# Patient Record
Sex: FEMALE | Race: WHITE | NOT HISPANIC OR LATINO
[De-identification: names, ages, dates, MRNs, and addresses within clinical notes are randomized per-mention and may not be internally consistent; named-entity substitution may affect disease eponyms.]

---

## 2021-01-03 ENCOUNTER — TRANSCRIPTION ENCOUNTER (OUTPATIENT)
Age: 76
End: 2021-01-03

## 2021-01-03 ENCOUNTER — FORM ENCOUNTER (OUTPATIENT)
Age: 76
End: 2021-01-03

## 2021-01-06 ENCOUNTER — APPOINTMENT (OUTPATIENT)
Dept: DISASTER EMERGENCY | Facility: HOSPITAL | Age: 76
End: 2021-01-06

## 2021-01-06 ENCOUNTER — OUTPATIENT (OUTPATIENT)
Dept: OUTPATIENT SERVICES | Facility: HOSPITAL | Age: 76
LOS: 1 days | End: 2021-01-06
Payer: MEDICARE

## 2021-01-06 VITALS
TEMPERATURE: 97 F | RESPIRATION RATE: 15 BRPM | SYSTOLIC BLOOD PRESSURE: 121 MMHG | OXYGEN SATURATION: 98 % | DIASTOLIC BLOOD PRESSURE: 58 MMHG | HEART RATE: 80 BPM

## 2021-01-06 VITALS
WEIGHT: 149.91 LBS | TEMPERATURE: 98 F | HEART RATE: 74 BPM | DIASTOLIC BLOOD PRESSURE: 65 MMHG | SYSTOLIC BLOOD PRESSURE: 129 MMHG | RESPIRATION RATE: 16 BRPM | HEIGHT: 63 IN | OXYGEN SATURATION: 99 %

## 2021-01-06 DIAGNOSIS — U07.1 COVID-19: ICD-10-CM

## 2021-01-06 PROBLEM — Z00.00 ENCOUNTER FOR PREVENTIVE HEALTH EXAMINATION: Status: ACTIVE | Noted: 2021-01-06

## 2021-01-06 PROCEDURE — M0239: CPT

## 2021-01-06 RX ORDER — ACETAMINOPHEN 500 MG
650 TABLET ORAL ONCE
Refills: 0 | Status: DISCONTINUED | OUTPATIENT
Start: 2021-01-06 | End: 2021-01-21

## 2021-01-06 RX ORDER — BAMLANIVIMAB 35 MG/ML
700 INJECTION, SOLUTION INTRAVENOUS ONCE
Refills: 0 | Status: COMPLETED | OUTPATIENT
Start: 2021-01-06 | End: 2021-01-06

## 2021-01-06 RX ADMIN — BAMLANIVIMAB 270 MILLIGRAM(S): 35 INJECTION, SOLUTION INTRAVENOUS at 15:45

## 2021-01-06 NOTE — CHART NOTE - NSCHARTNOTEFT_GEN_A_CORE
CC: Monoclonal Antibody Infusion/COVID 19 Positive  75yFemale with pmhx of TIA in 2015 on Plavix, Eosinophilic Asthma & sleep apnea (not on CPAP at home), presents today for monoclonal antibody infusion. Patient endorses onset of symptoms on 12/28 consisting of sore throat, HA, & nasal congestion, tested + 12/31, and subsequently referred for infusion today by PCP- Dr. Bonilla.    exam/findings:  T(C): --  HR: --  BP: --  RR: --  SpO2: --      PE:   Appearance: NAD	  HEENT:   Normal oral mucosa,   Lymphatic: No lymphadenopathy  Cardiovascular: Normal S1 S2, No JVD, No murmurs, No edema  Respiratory: Lungs clear to auscultation	  Gastrointestinal:  Soft, Non-tender, + BS	  Skin: warm and dry  Neurologic: Non-focal  Extremities: Normal range of motion,    ASSESSMENT:  Pt is a 75yFemale with pmhx of TIA in 2015 on Plavix, Eosinophilic Asthma & sleep apnea (not on CPAP at home), tested + 12/31, and subsequently referred for infusion today by PCP- Dr. Bonilla today to infusion center for Monoclonal antibody infusion (Bamlanivimab)  Symptoms/ Criteria: sore throat, HA, & nasal congestion,  Risk Profile includes: Age >65, hx of asthma    PLAN:  - Infusion procedure explained to patient   - Consent for monoclonal antibody infusion obtained   - Risk & benefits discussed/all questions answered  - Infuse Bamlanivimab 700mg  IV over one hour   - Observe patient for one hour post infusion    I have reviewed the Bamlanivimab Emergency Use Authorization (EUA) and I have provided the patient or patient's caregiver with the following information:      1. FDA has authorized emergency use Bamlanivimab, which is not an FDA-approved biological product.  2. The patient or patient's caregiver has the option to accept or refuse administration of Bamlanivimab.   3. The significant known and potential risks and benefits of Bamlanivimab and the extent to which such risks and benefits are unknown.  4. Information on available alternative treatments and risks and benefits of those alternatives. CC: Monoclonal Antibody Infusion/COVID 19 Positive  75yFemale with pmhx of TIA in 2015 on Plavix, Eosinophilic Asthma & sleep apnea (not on CPAP at home), presents today for monoclonal antibody infusion. Patient endorses onset of symptoms on 12/28 consisting of sore throat, HA, & nasal congestion, tested + 12/31, and subsequently referred for infusion today by PCP- Dr. Bonilla.    exam/findings:  Vital Signs Last 24 Hrs  T(C): 37 (06 Jan 2021 16:35), Max: 37.2 (06 Jan 2021 15:50)  T(F): 98.6 (06 Jan 2021 16:35), Max: 99 (06 Jan 2021 15:50)  HR: 75 (06 Jan 2021 16:35) (74 - 77)  BP: 119/62 (06 Jan 2021 16:35) (112/69 - 129/65)  RR: 16 (06 Jan 2021 16:35) (15 - 16)  SpO2: 98% (06 Jan 2021 16:35) (98% - 99%)      PE:   Appearance: NAD	  HEENT:   Normal oral mucosa,   Lymphatic: No lymphadenopathy  Cardiovascular: Normal S1 S2, No JVD, No murmurs, No edema  Respiratory: Lungs clear to auscultation	  Gastrointestinal:  Soft, Non-tender, + BS	  Skin: warm and dry  Neurologic: Non-focal  Extremities: Normal range of motion,    ASSESSMENT:  Pt is a 75yFemale with pmhx of TIA in 2015 on Plavix, Eosinophilic Asthma & sleep apnea (not on CPAP at home), tested + 12/31, and subsequently referred for infusion today by PCP- Dr. Bonilla today to infusion center for Monoclonal antibody infusion (Bamlanivimab)  Symptoms/ Criteria: sore throat, HA, & nasal congestion,  Risk Profile includes: Age >65, hx of asthma    PLAN:  - Infusion procedure explained to patient   - Consent for monoclonal antibody infusion obtained   - Risk & benefits discussed/all questions answered  - Infuse Bamlanivimab 700mg  IV over one hour   - Observe patient for one hour post infusion    I have reviewed the Bamlanivimab Emergency Use Authorization (EUA) and I have provided the patient or patient's caregiver with the following information:      1. FDA has authorized emergency use Bamlanivimab, which is not an FDA-approved biological product.  2. The patient or patient's caregiver has the option to accept or refuse administration of Bamlanivimab.   3. The significant known and potential risks and benefits of Bamlanivimab and the extent to which such risks and benefits are unknown.  4. Information on available alternative treatments and risks and benefits of those alternatives.

## 2021-01-07 ENCOUNTER — TRANSCRIPTION ENCOUNTER (OUTPATIENT)
Age: 76
End: 2021-01-07

## 2021-01-11 ENCOUNTER — TRANSCRIPTION ENCOUNTER (OUTPATIENT)
Age: 76
End: 2021-01-11

## 2023-02-22 ENCOUNTER — NON-APPOINTMENT (OUTPATIENT)
Age: 78
End: 2023-02-22

## 2023-03-01 ENCOUNTER — NON-APPOINTMENT (OUTPATIENT)
Age: 78
End: 2023-03-01

## 2023-03-02 ENCOUNTER — APPOINTMENT (OUTPATIENT)
Dept: NEUROSURGERY | Facility: CLINIC | Age: 78
End: 2023-03-02
Payer: MEDICARE

## 2023-03-02 VITALS
HEART RATE: 77 BPM | DIASTOLIC BLOOD PRESSURE: 66 MMHG | HEIGHT: 63 IN | BODY MASS INDEX: 25.87 KG/M2 | SYSTOLIC BLOOD PRESSURE: 131 MMHG | OXYGEN SATURATION: 99 % | WEIGHT: 146 LBS

## 2023-03-02 DIAGNOSIS — Z87.39 PERSONAL HISTORY OF OTHER DISEASES OF THE MUSCULOSKELETAL SYSTEM AND CONNECTIVE TISSUE: ICD-10-CM

## 2023-03-02 DIAGNOSIS — Z86.73 PERSONAL HISTORY OF TRANSIENT ISCHEMIC ATTACK (TIA), AND CEREBRAL INFARCTION W/OUT RESIDUAL DEFICITS: ICD-10-CM

## 2023-03-02 DIAGNOSIS — Z87.19 PERSONAL HISTORY OF OTHER DISEASES OF THE DIGESTIVE SYSTEM: ICD-10-CM

## 2023-03-02 DIAGNOSIS — I25.89 OTHER FORMS OF CHRONIC ISCHEMIC HEART DISEASE: ICD-10-CM

## 2023-03-02 DIAGNOSIS — Z82.49 FAMILY HISTORY OF ISCHEMIC HEART DISEASE AND OTHER DISEASES OF THE CIRCULATORY SYSTEM: ICD-10-CM

## 2023-03-02 DIAGNOSIS — Z85.820 PERSONAL HISTORY OF MALIGNANT MELANOMA OF SKIN: ICD-10-CM

## 2023-03-02 DIAGNOSIS — H35.379 PUCKERING OF MACULA, UNSPECIFIED EYE: ICD-10-CM

## 2023-03-02 DIAGNOSIS — Z80.1 FAMILY HISTORY OF MALIGNANT NEOPLASM OF TRACHEA, BRONCHUS AND LUNG: ICD-10-CM

## 2023-03-02 DIAGNOSIS — Z80.3 FAMILY HISTORY OF MALIGNANT NEOPLASM OF BREAST: ICD-10-CM

## 2023-03-02 DIAGNOSIS — Q21.1 ATRIAL SEPTAL DEFECT: ICD-10-CM

## 2023-03-02 DIAGNOSIS — G43.909 MIGRAINE, UNSPECIFIED, NOT INTRACTABLE, W/OUT STATUS MIGRAINOSUS: ICD-10-CM

## 2023-03-02 DIAGNOSIS — I67.1 CEREBRAL ANEURYSM, NONRUPTURED: ICD-10-CM

## 2023-03-02 DIAGNOSIS — Z87.891 PERSONAL HISTORY OF NICOTINE DEPENDENCE: ICD-10-CM

## 2023-03-02 DIAGNOSIS — Z87.09 PERSONAL HISTORY OF OTHER DISEASES OF THE RESPIRATORY SYSTEM: ICD-10-CM

## 2023-03-02 PROCEDURE — 99204 OFFICE O/P NEW MOD 45 MIN: CPT

## 2023-03-02 RX ORDER — CLOBETASOL PROPIONATE/COAL TAR 0.05%-2.3%
KIT, CREAM AND LIQUID TOPICAL
Refills: 0 | Status: ACTIVE | COMMUNITY

## 2023-03-02 RX ORDER — RIMEGEPANT SULFATE 75 MG/75MG
75 TABLET, ORALLY DISINTEGRATING ORAL
Refills: 0 | Status: ACTIVE | COMMUNITY

## 2023-03-02 RX ORDER — ESTRADIOL 10 UG/1
10 TABLET VAGINAL
Refills: 0 | Status: ACTIVE | COMMUNITY

## 2023-03-02 RX ORDER — ATORVASTATIN CALCIUM 20 MG/1
20 TABLET, FILM COATED ORAL
Refills: 0 | Status: ACTIVE | COMMUNITY

## 2023-03-02 RX ORDER — CLOPIDOGREL 75 MG/1
75 TABLET, FILM COATED ORAL
Refills: 0 | Status: ACTIVE | COMMUNITY

## 2023-03-02 RX ORDER — EZETIMIBE 10 MG/1
10 TABLET ORAL
Refills: 0 | Status: ACTIVE | COMMUNITY

## 2023-03-02 NOTE — PHYSICAL EXAM
[General Appearance - Alert] : alert [General Appearance - Well Nourished] : well nourished [General Appearance - Well-Appearing] : healthy appearing [Oriented To Time, Place, And Person] : oriented to person, place, and time [Affect] : the affect was normal [Memory Recent] : recent memory was not impaired [Person] : oriented to person [Place] : oriented to place [Time] : oriented to time [Cranial Nerves Optic (II)] : visual acuity intact bilaterally,  pupils equal round and reactive to light [Cranial Nerves Oculomotor (III)] : extraocular motion intact [Cranial Nerves Trigeminal (V)] : facial sensation intact symmetrically [Cranial Nerves Facial (VII)] : face symmetrical [Cranial Nerves Vestibulocochlear (VIII)] : hearing was intact bilaterally [Cranial Nerves Glossopharyngeal (IX)] : tongue and palate midline [Cranial Nerves Accessory (XI - Cranial And Spinal)] : head turning and shoulder shrug symmetric [Cranial Nerves Hypoglossal (XII)] : there was no tongue deviation with protrusion [Motor Tone] : muscle tone was normal in all four extremities [Sclera] : the sclera and conjunctiva were normal [Outer Ear] : the ears and nose were normal in appearance [Neck Appearance] : the appearance of the neck was normal [] : no respiratory distress [Respiration, Rhythm And Depth] : normal respiratory rhythm and effort [Exaggerated Use Of Accessory Muscles For Inspiration] : no accessory muscle use [Heart Rate And Rhythm] : heart rate was normal and rhythm regular [Abnormal Walk] : normal gait [Involuntary Movements] : no involuntary movements were seen [Skin Color & Pigmentation] : normal skin color and pigmentation

## 2023-03-06 ENCOUNTER — TRANSCRIPTION ENCOUNTER (OUTPATIENT)
Age: 78
End: 2023-03-06

## 2023-03-17 NOTE — ASSESSMENT
[FreeTextEntry1] : IMPRESSION:\par 2 MM OUTPOUCHING ARISING FROM SUPRACLINOID SEGMENT OF DANYA CONSISTENT WITH AN ANEURYSM.\par \par \par PLAN:\par 1. Explained to patient that there is a low risk for aneurysmal rupture based on the size and location of the aneurysm.\par 2. Explained that the option of having a cerebral angiogram for better evaluation of the aneurysm is also an option as well as f/u imaging,\par 3. Patient would like to have follow up imaging.\par 4. MRA brain in 1 year from last study 11/2023.

## 2023-03-17 NOTE — HISTORY OF PRESENT ILLNESS
[de-identified] : Lynn Ndiaye is a pleasant 77 year old lady who had neuro imaging done at  [facility] 11[facility] due to c/o dizziness.  There is an incidental finding of a 2 mm aneurysm from the supraclinoid segment of the DANYA.  Pt denies symptoms of weakness in the extremities, speech impairment.  She reports a history of TIA in 2015 and takes ASA and Plavix.  \par \par Neurologist - Migraines Dr. Hsieh  - [facility].\par \par Dr. Sam Vázquez - regular Neurologist\par \par Dr.. Sam Bonilla - PCP.\par \par Saw Dr. Mendez Bonds at [facility]

## 2023-03-22 ENCOUNTER — NON-APPOINTMENT (OUTPATIENT)
Age: 78
End: 2023-03-22

## 2023-11-16 ENCOUNTER — APPOINTMENT (OUTPATIENT)
Dept: NEUROSURGERY | Facility: CLINIC | Age: 78
End: 2023-11-16